# Patient Record
Sex: MALE | Race: WHITE | NOT HISPANIC OR LATINO
[De-identification: names, ages, dates, MRNs, and addresses within clinical notes are randomized per-mention and may not be internally consistent; named-entity substitution may affect disease eponyms.]

---

## 2020-01-10 ENCOUNTER — APPOINTMENT (OUTPATIENT)
Dept: VASCULAR SURGERY | Facility: CLINIC | Age: 60
End: 2020-01-10
Payer: COMMERCIAL

## 2020-01-10 VITALS
BODY MASS INDEX: 28.23 KG/M2 | HEIGHT: 74 IN | HEART RATE: 69 BPM | TEMPERATURE: 97.8 F | DIASTOLIC BLOOD PRESSURE: 74 MMHG | WEIGHT: 220 LBS | SYSTOLIC BLOOD PRESSURE: 124 MMHG

## 2020-01-10 DIAGNOSIS — I73.9 PERIPHERAL VASCULAR DISEASE, UNSPECIFIED: ICD-10-CM

## 2020-01-10 PROCEDURE — 93922 UPR/L XTREMITY ART 2 LEVELS: CPT

## 2020-01-10 PROCEDURE — 99203 OFFICE O/P NEW LOW 30 MIN: CPT

## 2020-01-10 NOTE — HISTORY OF PRESENT ILLNESS
[FreeTextEntry1] : 59M, former pt of Dr. Glover.\par Has not been seen by a vascular surgeon in a few years.\par Overall he states he is in good health.  He is able to walk and go up stairs without difficulty.  He cycles daily.  He has no non healing wounds or ulcers.  He follows with podiatry regularly.\par He does not complain of heaviness, aching, or swelling of his legs.  He does occasionally wear compression stockings, and notices he feels somewhat better when they are on.\par \par He does report neuropathy of the feet, states it has been like that for many years, reports minimal sensation of the feet.\par \par Former smoker, last cig 30 years ago\par ASA 81.  Takes 2 antihypertensive medications\par states he believes his cholesterol is controlled, unsure of ldl value\par "prediabetes", not on medication\par \par No hx cad

## 2020-01-10 NOTE — DATA REVIEWED
[FreeTextEntry1] : I reviewed prior vascular studies, including carotid duplex which showed no sig stenoses bialt.\par yayo appeared normal\par art duplex with some atherosclerotic disease\par venous duplex no dvt

## 2020-01-10 NOTE — PHYSICAL EXAM
[Normal Breath Sounds] : Normal breath sounds [Normal Heart Sounds] : normal heart sounds [2+] : right 2+ [Ankle Swelling On The Right] : mild [Varicose Veins Of Lower Extremities] : present [Ankle Swelling (On Exam)] : not present [de-identified] : awake, alert [FreeTextEntry1] : feet warm, but unable to palpate pedal pulses.\par no wounds [de-identified] : no lad [de-identified] : no gross motor or sensory deficits

## 2020-01-10 NOTE — ASSESSMENT
[FreeTextEntry1] : no acute intervention warranted.\par Given his lack of life limiting symptoms, rec fu at age 63 for AAA screen

## 2025-08-04 ENCOUNTER — APPOINTMENT (OUTPATIENT)
Dept: DERMATOLOGY | Facility: CLINIC | Age: 65
End: 2025-08-04